# Patient Record
(demographics unavailable — no encounter records)

---

## 2020-07-24 NOTE — NUR
PT REFUSED MORPHINE AT THIS TIME UNTIL SHE KNOWS IF SHE WILL BE ADMITTED, 
STATED SHE DOES NOT HAVE A RIDE AVAILABLE IF DISCHARGED.

## 2020-10-31 NOTE — NUR
consent for blood has been signed by all parties. Pt is currently eating some 
food as she was hungry. She seems to be irritable, hopefully food will help 
with this.

## 2020-10-31 NOTE — NUR
First unit of blood was started.

She is having issues at home with the  whom is watching her 7 year 
old and is wanting to hurry with everything.

when I got back from getting the blood at the blood bank she is laying down, 
she said I could give her the unit of blood.

## 2020-11-19 NOTE — NUR
Received from OR via [facility], accompanied by Anesthesiologist DR WRIGHT and report given by 
Anesthesiolgist. PATIENT A&OX4, PAIN 10/10 PER PT MEDICATED PRE ORDERS, V/S WNL, 
NEUROVASCULAR CHECKS INTACT, 20G PIV UE.

## 2020-11-19 NOTE — NUR
PT CONTS TO C/O 10/10 PAIN AFTER 75MG OF DEMEROL AND 12MG OF MORPHINE, TORADOL AND IV 
TYLENOL. ENCOURAGING PT TO GET UP AND AMB TO DISTRIBUTE ABD GAS. PT NOT VERY WILLING TO DO 
SO

## 2020-11-20 NOTE — NUR
Problems reprioritized. Patient report given, questions answered & plan of care reviewed 
with RASHAWN NIEVES.

## 2021-08-22 NOTE — NUR
PT IS RESTING QUIETLY ON GURNE, WAITING FOR ULTRASOUND RESULTS, C/O LOWER ABD 
PAIN 5/10, "IT IS THE SAME PAIN I CAME IN WITH" - - -

## 2024-02-09 NOTE — NUR
PT CONTINUED TO STRUGGLE WITH PAIN CONTROL THROUGH PACU STAY. SHE HAS BEEN TACHYCARDIC SINCE 
SURGERY, 120'S. BP STABLE. UNABLE TO GET OOB WITHOUT FEELING FLUSHED AND NEEDING TO LAY BACK 
DOWN. DR ORTEGA WAS NOTIFIED, ADMIT ORDERS RECD. DILUADID CADD STARTED FOR PAIN CONTROL, PT 
EDUCATED ON ITS USE. SHE VERBALIZED UNDERSTANDING. TOLERATING PO FLUIDS WELL. TRANSFERRED TO 
[facility] UNIT FOR OVERNIGHT OBSERVATION. REPORT GIVEN TO RECEIVING RN. PT BACKPACK AND CLOTHING 
WITH HER IN NEW ROOM. Yes